# Patient Record
Sex: FEMALE
[De-identification: names, ages, dates, MRNs, and addresses within clinical notes are randomized per-mention and may not be internally consistent; named-entity substitution may affect disease eponyms.]

---
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
## 2024-06-27 ENCOUNTER — NURSE ONLY (OUTPATIENT)
Dept: INTERNAL MEDICINE CLINIC | Facility: HOSPITAL | Age: 24
End: 2024-06-27
Attending: PREVENTIVE MEDICINE

## 2024-06-27 DIAGNOSIS — Z00.00 WELLNESS EXAMINATION: Primary | ICD-10-CM

## 2024-06-27 PROCEDURE — 86480 TB TEST CELL IMMUN MEASURE: CPT

## 2024-06-28 LAB
M TB IFN-G CD4+ T-CELLS BLD-ACNC: 0.04 IU/ML
M TB TUBERC IFN-G BLD QL: NEGATIVE
M TB TUBERC IGNF/MITOGEN IGNF CONTROL: >10 IU/ML
QFT TB1 AG MINUS NIL: 0.03 IU/ML
QFT TB2 AG MINUS NIL: 0.02 IU/ML

## 2024-10-29 ENCOUNTER — OFFICE VISIT (OUTPATIENT)
Facility: CLINIC | Age: 24
End: 2024-10-29
Payer: COMMERCIAL

## 2024-10-29 VITALS
SYSTOLIC BLOOD PRESSURE: 116 MMHG | HEIGHT: 67 IN | BODY MASS INDEX: 31.55 KG/M2 | DIASTOLIC BLOOD PRESSURE: 72 MMHG | WEIGHT: 201 LBS | HEART RATE: 84 BPM

## 2024-10-29 DIAGNOSIS — Z11.3 ROUTINE SCREENING FOR STI (SEXUALLY TRANSMITTED INFECTION): ICD-10-CM

## 2024-10-29 DIAGNOSIS — Z01.419 ENCOUNTER FOR WELL WOMAN EXAM WITH ROUTINE GYNECOLOGICAL EXAM: ICD-10-CM

## 2024-10-29 DIAGNOSIS — Z30.41 USES ORAL CONTRACEPTION: ICD-10-CM

## 2024-10-29 PROCEDURE — 87591 N.GONORRHOEAE DNA AMP PROB: CPT

## 2024-10-29 PROCEDURE — 87491 CHLMYD TRACH DNA AMP PROBE: CPT

## 2024-10-29 PROCEDURE — 99385 PREV VISIT NEW AGE 18-39: CPT

## 2024-10-29 RX ORDER — NORGESTIMATE AND ETHINYL ESTRADIOL 0.25-0.035
1 KIT ORAL DAILY
Qty: 28 TABLET | Refills: 11 | Status: SHIPPED | OUTPATIENT
Start: 2024-10-29 | End: 2025-09-30

## 2024-10-29 RX ORDER — NORGESTIMATE AND ETHINYL ESTRADIOL 0.25-0.035
KIT ORAL
COMMUNITY
Start: 2021-06-28 | End: 2024-10-29

## 2024-10-29 NOTE — PROGRESS NOTES
Fouzia Hernandez is a 24 year old female  Patient's last menstrual period was 10/08/2024 (exact date).   Chief Complaint   Patient presents with    Wellness Visit    Contraception     Refill of birth control pills.   .  Per pt she had a Pap smear last year via [facility] - results normal   She has received HPV vaccine previously.     OBSTETRICS HISTORY:  OB History    Para Term  AB Living   0 0 0 0 0 0   SAB IAB Ectopic Multiple Live Births   0 0 0 0 0       GYNE HISTORY:  Periods regular monthly    History   Sexual Activity    Sexual activity: Yes    Partners: Male    Birth control/ protection: OCP        Hx Prior Abnormal Pap: No  Follow Up Recommendation: Last pap smear per pt, , normal.        MEDICAL HISTORY:  History reviewed. No pertinent past medical history.    SURGICAL HISTORY:  History reviewed. No pertinent surgical history.    SOCIAL HISTORY:  Social History     Socioeconomic History    Marital status: Single     Spouse name: Not on file    Number of children: Not on file    Years of education: Not on file    Highest education level: Not on file   Occupational History    Not on file   Tobacco Use    Smoking status: Never    Smokeless tobacco: Never   Substance and Sexual Activity    Alcohol use: Yes    Drug use: Never    Sexual activity: Yes     Partners: Male     Birth control/protection: OCP   Other Topics Concern    Not on file   Social History Narrative    Not on file     Social Drivers of Health     Financial Resource Strain: Not on File (10/6/2022)    Received from [facility]    Financial Resource Strain     Financial Resource Strain: 0   Food Insecurity: Not at Risk (2024)    Received from [facility]    Food Insecurity     Food: 1   Transportation Needs: Not at Risk (2024)    Received from [facility]    Transportation Needs     Transportation: 1   Physical Activity: Not on File (10/6/2022)    Received from [facility]    Physical Activity     Physical Activity: 0   Stress: Not on File  (10/6/2022)    Received from [facility]    Stress     Stress: 0   Social Connections: Not on File (2024)    Received from [employer] [facility]    [employer]     Connectedness: 0   Housing Stability: Not at Risk (2024)    Received from [employer]    Housing Stability     Housin       FAMILY HISTORY:  History reviewed. No pertinent family history.    MEDICATIONS:    Current Outpatient Medications:     Norgestimate-Eth Estradiol (MONO-LINYAH) 0.25-35 MG-MCG Oral Tab, Take 1 tablet by mouth daily., Disp: 28 tablet, Rfl: 11    ALLERGIES:  Allergies[1]      Review of Systems:  Constitutional:  Denies fatigue, night sweats, hot flashes  Eyes:  denies blurred or double vision  Cardiovascular:  denies chest pain or palpitations  Respiratory:  denies shortness of breath  Gastrointestinal:  denies heartburn, abdominal pain, diarrhea or constipation  Genitourinary:  denies dysuria, incontinence, abnormal vaginal discharge, vaginal itching  Musculoskeletal:  denies back pain.  Skin/Breast:  Denies any breast pain, lumps, or discharge.   Neurological:  denies headaches, extremity weakness or numbness.  Psychiatric: denies depression or anxiety.  Endocrine:   denies excessive thirst or urination.  Heme/Lymph:  denies history of anemia, easy bruising or bleeding.      PHYSICAL EXAM:   Constitutional: well developed, well nourished  Head/Face: normocephalic  Neck/Thyroid: thyroid symmetric, no thyromegaly, no nodules, no adenopathy  Lymphatic:no abnormal supraclavicular or axillary adenopathy is noted  Breast: normal without palpable masses, tenderness, asymmetry, nipple discharge, nipple retraction or skin changes  Abdomen:  soft, nontender, nondistended, no masses  Skin/Hair: no unusual rashes or bruises  Extremities: no edema, no cyanosis  Psychiatric:  Oriented to time, place, person and situation. Appropriate mood and affect    Pelvic Exam:  External Genitalia: normal appearance, hair distribution, and no lesions  Urethral Meatus:   normal in size, location, without lesions and prolapse  Bladder:  No fullness, masses or tenderness  Vagina:  Normal appearance without lesions, no abnormal discharge  Cervix:  Normal without tenderness on motion  Uterus: normal in size, contour, position, mobility, without tenderness  Adnexa: normal without masses or tenderness  Perineum: normal  Anus: no hemorroids     Assessment & Plan:  Diagnoses and all orders for this visit:    Encounter for well woman exam with routine gynecological exam    Uses oral contraception  -     Norgestimate-Eth Estradiol (MONO-LINYAH) 0.25-35 MG-MCG Oral Tab; Take 1 tablet by mouth daily.    Routine screening for STI (sexually transmitted infection)  -     Chlamydia/Gc Amplification; Future                   [1] Not on File

## 2024-10-30 LAB
C TRACH DNA SPEC QL NAA+PROBE: NEGATIVE
N GONORRHOEA DNA SPEC QL NAA+PROBE: NEGATIVE

## 2025-01-27 DIAGNOSIS — Z30.41 USES ORAL CONTRACEPTION: ICD-10-CM

## 2025-01-27 RX ORDER — NORGESTIMATE AND ETHINYL ESTRADIOL 0.25-0.035
1 KIT ORAL DAILY
Qty: 84 TABLET | Refills: 1 | Status: SHIPPED | OUTPATIENT
Start: 2025-01-27

## 2025-02-06 ENCOUNTER — OFFICE VISIT (OUTPATIENT)
Dept: OTHER | Facility: HOSPITAL | Age: 25
End: 2025-02-06
Attending: EMERGENCY MEDICINE
Payer: COMMERCIAL

## 2025-02-06 ENCOUNTER — HOSPITAL ENCOUNTER (OUTPATIENT)
Dept: CT IMAGING | Facility: HOSPITAL | Age: 25
Discharge: HOME | End: 2025-02-06
Attending: EMERGENCY MEDICINE
Payer: COMMERCIAL

## 2025-02-06 DIAGNOSIS — R52 PAIN: Primary | ICD-10-CM

## 2025-02-06 DIAGNOSIS — R52 PAIN: ICD-10-CM

## 2025-02-06 PROCEDURE — 70450 CT HEAD/BRAIN W/O DYE: CPT | Performed by: EMERGENCY MEDICINE

## 2025-02-13 ENCOUNTER — APPOINTMENT (OUTPATIENT)
Dept: OTHER | Facility: HOSPITAL | Age: 25
End: 2025-02-13
Attending: EMERGENCY MEDICINE

## 2025-04-25 ENCOUNTER — OFFICE VISIT (OUTPATIENT)
Dept: INTERNAL MEDICINE CLINIC | Facility: CLINIC | Age: 25
End: 2025-04-25
Payer: COMMERCIAL

## 2025-04-25 VITALS
WEIGHT: 192.19 LBS | BODY MASS INDEX: 30.89 KG/M2 | HEART RATE: 96 BPM | HEIGHT: 66.14 IN | DIASTOLIC BLOOD PRESSURE: 72 MMHG | RESPIRATION RATE: 16 BRPM | OXYGEN SATURATION: 100 % | SYSTOLIC BLOOD PRESSURE: 132 MMHG | TEMPERATURE: 99 F

## 2025-04-25 DIAGNOSIS — Z30.41 USES ORAL CONTRACEPTION: ICD-10-CM

## 2025-04-25 DIAGNOSIS — F41.9 ANXIOUSNESS: ICD-10-CM

## 2025-04-25 DIAGNOSIS — L98.9 LESION OF FINGER: ICD-10-CM

## 2025-04-25 DIAGNOSIS — Z00.00 WELLNESS EXAMINATION: Primary | ICD-10-CM

## 2025-04-25 DIAGNOSIS — L73.9 FOLLICULITIS: ICD-10-CM

## 2025-04-25 DIAGNOSIS — E04.9 ENLARGED THYROID: ICD-10-CM

## 2025-04-25 RX ORDER — MUPIROCIN CALCIUM 20 MG/G
1 CREAM TOPICAL DAILY
Qty: 15 G | Refills: 1 | Status: SHIPPED | OUTPATIENT
Start: 2025-04-25 | End: 2025-05-02

## 2025-04-25 RX ORDER — NORGESTIMATE AND ETHINYL ESTRADIOL 0.25-0.035
1 KIT ORAL DAILY
Qty: 84 TABLET | Refills: 1 | Status: SHIPPED | OUTPATIENT
Start: 2025-04-25

## 2025-04-25 NOTE — PROGRESS NOTES
Fouzia Hernandez  5/30/2000    Chief Complaint   Patient presents with    [facility]     ES rm -14 -  Establish care     SUBJECTIVE   Fouzia Hernandez is a 24 year old female who presents to [facility].    Past Medical History: none  Past Surgical History: none  Mediations:  Allergies  Family History: denies history of heart disease, cancer, diabetes in immediate family members  Social:   Tobacco: never   Occupation: float at [employer]    Review of Systems   Review of Systems   No f/c/chest pain or sob. No cough. No abd pain/n/v/d. No ha or dizziness. No numbness, tingling, or weakness.     OBJECTIVE:   /72 (BP Location: Left arm, Patient Position: Sitting, Cuff Size: adult)   Pulse 96   Temp 98.6 °F (37 °C) (Temporal)   Resp 16   Ht 5' 6.14\" (1.68 m)   Wt 192 lb 3.2 oz (87.2 kg)   LMP 10/08/2024 (Exact Date)   SpO2 100%   BMI 30.89 kg/m²   Physical Exam   Constitutional: Oriented to person, place, and time. No distress.   HEENT:  Normocephalic and atraumatic.Tympanic membranes appear normal. Oropharynx is clear and moist.   Eyes: Conjunctivae not injected.  Extraocular movements are intact  Neck: Full ROM.  Neck supple.  Symmetrically enlarged thyroid with no discrete nodules palpated.  Cardiovascular: Normal rate, regular rhythm and intact distal pulses.  No murmur, rubs or gallops.   Pulmonary/Chest: Effort normal and breath sounds normal. No respiratory distress.  Abdominal: Soft. Bowel sounds are normal. Non tender, no masses, no organomegaly or hernias.  Musculoskeletal: No edema in bilateral lower extremities.  Strength is 5/5 in bilateral upper and lower extremities.  Lymphadenopathy: No cervical adenopathy.   Neurological: No focal neurologic deficits.  Cranial nerves II through XII are intact.   Skin: 1 enlarged hair follicle in pubic region.  No lesions in the visualized left inguinal area.  On the lateral aspect of the left index finger is a slightly erythematous solitary papule.  No  visible foreign bodies no underlying fluctuance.  Psychiatric: Normal mood and affect.     ASSESSMENT AND PLAN:     1. Wellness examination  She is going to send vaccination record via [facility].  - CBC With Differential With Platelet; Future  - Comp Metabolic Panel; Future  - Lipid Panel; Future  - TSH W Reflex To Free T4; Future    2. Uses oral contraception  - Norgestimate-Eth Estradiol (MONO-LINYAH) 0.25-35 MG-MCG Oral Tab; Take 1 tablet by mouth daily.  Dispense: 84 tablet; Refill: 1    3. Enlarged thyroid  - US THYROID (CPT=76536); Future    4. Anxiousness  - [facility] Referral - In Network    5. Lesion of finger  Will try a few days of topical antibiotic.  No visible foreign bodies.  Not consistent with other infectious etiology such as hepatic vivienne.  - mupirocin 2 % External Cream; Apply 1 Application topically daily for 7 days.  Dispense: 15 g; Refill: 1    6. Folliculitis   no other lesions seen around the meatus or in the groin.Per patient not actively flaring at this time.  She was instructed to come back if flaring.  Consider HS?  - mupirocin 2 % External Cream; Apply 1 Application topically daily for 7 days.  Dispense: 15 g; Refill: 1     TODAY'S ORDERS     Orders Placed This Encounter   Procedures    CBC With Differential With Platelet    Comp Metabolic Panel    Lipid Panel    TSH W Reflex To Free T4       Meds & Refills:  Requested Prescriptions     Signed Prescriptions Disp Refills    Norgestimate-Eth Estradiol (MONO-LINYAH) 0.25-35 MG-MCG Oral Tab 84 tablet 1     Sig: Take 1 tablet by mouth daily.    mupirocin 2 % External Cream 15 g 1     Sig: Apply 1 Application topically daily for 7 days.       Imaging & Consults:  OP REFERRAL TO [facility]  US THYROID (CPT=76536)    No follow-ups on file.  There are no Patient Instructions on file for this visit.    All questions were answered and the patient agrees with the plan.     Thank you,  Jorge Weaver, DO

## 2025-06-07 ENCOUNTER — HOSPITAL ENCOUNTER (OUTPATIENT)
Dept: ULTRASOUND IMAGING | Age: 25
Discharge: HOME | End: 2025-06-07
Attending: INTERNAL MEDICINE
Payer: COMMERCIAL

## 2025-06-07 ENCOUNTER — PATIENT MESSAGE (OUTPATIENT)
Dept: INTERNAL MEDICINE CLINIC | Facility: CLINIC | Age: 25
End: 2025-06-07

## 2025-06-07 DIAGNOSIS — E04.9 ENLARGED THYROID: ICD-10-CM

## 2025-06-07 PROCEDURE — 76536 US EXAM OF HEAD AND NECK: CPT | Performed by: INTERNAL MEDICINE

## 2025-06-13 ENCOUNTER — OFFICE VISIT (OUTPATIENT)
Facility: LOCATION | Age: 25
End: 2025-06-13
Payer: COMMERCIAL

## 2025-06-13 DIAGNOSIS — E04.9 THYROID GOITER: ICD-10-CM

## 2025-06-13 DIAGNOSIS — E04.1 RIGHT THYROID NODULE: Primary | ICD-10-CM

## 2025-06-13 PROCEDURE — 99204 OFFICE O/P NEW MOD 45 MIN: CPT | Performed by: OTOLARYNGOLOGY

## 2025-06-13 NOTE — PROGRESS NOTES
Otolaryngology Consultation Note     Reason for consultation: thyroid nodule  Consulting physician and service: Jorge Weaver DO      HPI: 26 y/o F presents with enlarged thyroid gland and thyroid nodule. No personal or family h/o thyroid disorders or thyroid cancer. No signs/symptoms hyper/hypothyroidism. No changes in voice/hoarseness. No dysphagia, odynophagia or difficulty breathing. No unintentional weight loss or loss of appetite. No other complaints.      Past Medical History: Past Medical History[1]     Past Surgical History: Past Surgical History[2]     Medication: Scheduled Meds:Scheduled Medications[3]  Continuous Infusions:Medication Infusions[4]  PRN Meds:.PRN Medications[5]     Allergies:  Allergies[6]  Pertinent Family History: Family History[7]     Pertinent Social History:   Social History     Socioeconomic History    Marital status: Single     Spouse name: Not on file    Number of children: Not on file    Years of education: Not on file    Highest education level: Not on file   Occupational History    Not on file   Tobacco Use    Smoking status: Never     Passive exposure: Never    Smokeless tobacco: Never   Vaping Use    Vaping status: Never Used   Substance and Sexual Activity    Alcohol use: Yes     Alcohol/week: 2.0 standard drinks of alcohol     Types: 1 Glasses of wine, 1 Shots of liquor per week     Comment: socially - 4/25/25    Drug use: Never    Sexual activity: Yes     Partners: Male     Birth control/protection: OCP   Other Topics Concern    Caffeine Concern No    Exercise No    Seat Belt No    Special Diet No    Stress Concern No    Weight Concern Yes   Social History Narrative    Not on file     Social Drivers of Health     Food Insecurity: Not at Risk (5/25/2024)    Received from [employer]    Food Insecurity     Food: 1   Transportation Needs: Not at Risk (5/25/2024)    Received from [facility]    Transportation Needs     Transportation: 1   Stress: Not on File (10/6/2022)    Received  from [facility]    Stress     Stress: 0   Housing Stability: Not at Risk (2024)    Received from [facility]    Housing Stability     Housin        Review of Systems:  Constitutional: Negative.  HENT: see above  Eyes: Negative.  Respiratory: Negative.  Cardiovascular: Negative.  Gastrointestinal: Negative.  Musculoskeletal: Negative.  Skin: Negative.  Renal: Negative  Endocrine: Negative  Psychiatric/Behavioral: Negative.     Physical Examination:  Vitals: Last menstrual period 10/08/2024, not currently breastfeeding.     General: Breathing comfortably on room air while sitting up. Able to communicate verbally. Voice normal. Normal appearing body habitus.     Musculoskeletal: Head: Atraumatic and normocephalic.     Neck: Full ROM and able to extend without issues     Ears: External auditory canals clear with no evidence of significant cerumen or stenosis. Tympanic membranes visible with no evidence of retraction or perforation. No evidence of middle ear effusion bilaterally.     Nose: No sinus tenderness bilaterally upon palpation. No obvious nasal deformity. No masses, rhinorrhea, epistaxis. Nasal septum, mucosa, and turbinates appear normal.     Mouth/Throat: Salivary glands appear normal with no evidence of pain or mass. No masses or lesions noted within the oral mucosa, hard and soft palates, tongue, tonsils and posterior pharynx. Able to tolerate secretions. Oral cavity and oropharynx widely patent. Tonsils 1 plus and symmetric. Posterior pharyngeal walls appear normal. Thyroid non tender to palpation without evidence of mass or nodules, right thyroid gland enlarged.      Eyes: Extraocular movements intact and pupils equally reactive to light stimulus. No spontaneous or gaze-evoked nystagmus. No proptosis or ecchymosis. VFI.     Lymphatic: No significant cervical lymphadenopathy noted.     Neuro: CN 7 intact with symmetric mobility and strength. No loss of facial sensation.     Skin: Dry, normal turgor, normal  color.     Psych: Alert and oriented to person/place/time. Normal affect, amiable     Significant laboratory values: n/a     Imaging:   US Thyroid results reviewed    FINDINGS:       MEASUREMENTS:  RIGHT LOBE:  6.9 x 1.6 x 2.6 cm  LEFT LOBE:  5.8 x 1.4 x 2.3 cm  ISTHMUS:  0.3 cm     NODULES:  There is a single mildly hypoechoic solid nodule within the midpole the right lobe measuring 18 x 9 x 13 mm.  This is a TI-RADS 4. No other thyroid nodules are seen.       OTHER:  The thyroid gland does not appear hypervascular.  No adenopathy is identified in the soft tissues adjacent to the thyroid gland.                   Impression   CONCLUSION:    1. Thyromegaly.  2. Dominant TI-RADS 4 lesion in the midpole the right lobe.  Fine needle aspiration biopsy recommended.          Procedures: n/a     Assessment/Plan:     Right thyroid nodule: recommend FNA. Advised patient to undergo TFTs prior to biopsy.  Thyroid goiter: asymptomatic. Will monitor      Dr. Jorge Weaver DO, thank you for involving me in this patient's care. Please contact me with further questions or concerns.    Safia Wilson MD         [1] No past medical history on file.  [2] No past surgical history on file.  [3] [4] [5] [6] Not on File  [7] No family history on file.

## 2025-06-28 ENCOUNTER — LAB ENCOUNTER (OUTPATIENT)
Dept: LAB | Age: 25
End: 2025-06-28
Attending: INTERNAL MEDICINE
Payer: COMMERCIAL

## 2025-06-28 DIAGNOSIS — Z00.00 WELLNESS EXAMINATION: ICD-10-CM

## 2025-06-28 PROBLEM — E78.00 PURE HYPERCHOLESTEROLEMIA: Status: ACTIVE | Noted: 2025-06-28

## 2025-06-28 LAB
ALBUMIN SERPL-MCNC: 4.5 G/DL (ref 3.2–4.8)
ALBUMIN/GLOB SERPL: 1.4 {RATIO} (ref 1–2)
ALP LIVER SERPL-CCNC: 69 U/L (ref 37–98)
ALT SERPL-CCNC: 11 U/L (ref 10–49)
ANION GAP SERPL CALC-SCNC: 10 MMOL/L (ref 0–18)
AST SERPL-CCNC: 20 U/L (ref ?–34)
BASOPHILS # BLD AUTO: 0.03 X10(3) UL (ref 0–0.2)
BASOPHILS NFR BLD AUTO: 0.4 %
BILIRUB SERPL-MCNC: 0.5 MG/DL (ref 0.3–1.2)
BUN BLD-MCNC: 17 MG/DL (ref 9–23)
CALCIUM BLD-MCNC: 9.6 MG/DL (ref 8.7–10.6)
CHLORIDE SERPL-SCNC: 105 MMOL/L (ref 98–112)
CHOLEST SERPL-MCNC: 207 MG/DL (ref ?–200)
CO2 SERPL-SCNC: 24 MMOL/L (ref 21–32)
CREAT BLD-MCNC: 0.89 MG/DL (ref 0.55–1.02)
EGFRCR SERPLBLD CKD-EPI 2021: 93 ML/MIN/1.73M2 (ref 60–?)
EOSINOPHIL # BLD AUTO: 0.07 X10(3) UL (ref 0–0.7)
EOSINOPHIL NFR BLD AUTO: 0.8 %
ERYTHROCYTE [DISTWIDTH] IN BLOOD BY AUTOMATED COUNT: 13.4 %
FASTING PATIENT LIPID ANSWER: YES
FASTING STATUS PATIENT QL REPORTED: YES
GLOBULIN PLAS-MCNC: 3.2 G/DL (ref 2–3.5)
GLUCOSE BLD-MCNC: 91 MG/DL (ref 70–99)
HCT VFR BLD AUTO: 37.5 % (ref 35–48)
HDLC SERPL-MCNC: 61 MG/DL (ref 40–59)
HGB BLD-MCNC: 12.2 G/DL (ref 12–16)
IMM GRANULOCYTES # BLD AUTO: 0.02 X10(3) UL (ref 0–1)
IMM GRANULOCYTES NFR BLD: 0.2 %
LDLC SERPL CALC-MCNC: 124 MG/DL (ref ?–100)
LYMPHOCYTES # BLD AUTO: 2.27 X10(3) UL (ref 1–4)
LYMPHOCYTES NFR BLD AUTO: 26.5 %
MCH RBC QN AUTO: 28.9 PG (ref 26–34)
MCHC RBC AUTO-ENTMCNC: 32.5 G/DL (ref 31–37)
MCV RBC AUTO: 88.9 FL (ref 80–100)
MONOCYTES # BLD AUTO: 0.47 X10(3) UL (ref 0.1–1)
MONOCYTES NFR BLD AUTO: 5.5 %
NEUTROPHILS # BLD AUTO: 5.71 X10 (3) UL (ref 1.5–7.7)
NEUTROPHILS # BLD AUTO: 5.71 X10(3) UL (ref 1.5–7.7)
NEUTROPHILS NFR BLD AUTO: 66.6 %
NONHDLC SERPL-MCNC: 146 MG/DL (ref ?–130)
OSMOLALITY SERPL CALC.SUM OF ELEC: 289 MOSM/KG (ref 275–295)
PLATELET # BLD AUTO: 278 10(3)UL (ref 150–450)
POTASSIUM SERPL-SCNC: 4.1 MMOL/L (ref 3.5–5.1)
PROT SERPL-MCNC: 7.7 G/DL (ref 5.7–8.2)
RBC # BLD AUTO: 4.22 X10(6)UL (ref 3.8–5.3)
SODIUM SERPL-SCNC: 139 MMOL/L (ref 136–145)
TRIGL SERPL-MCNC: 127 MG/DL (ref 30–149)
TSI SER-ACNC: 1.09 UIU/ML (ref 0.55–4.78)
VLDLC SERPL CALC-MCNC: 22 MG/DL (ref 0–30)
WBC # BLD AUTO: 8.6 X10(3) UL (ref 4–11)

## 2025-06-28 PROCEDURE — 84443 ASSAY THYROID STIM HORMONE: CPT

## 2025-06-28 PROCEDURE — 36415 COLL VENOUS BLD VENIPUNCTURE: CPT

## 2025-06-28 PROCEDURE — 80061 LIPID PANEL: CPT

## 2025-06-28 PROCEDURE — 85025 COMPLETE CBC W/AUTO DIFF WBC: CPT

## 2025-06-28 PROCEDURE — 80053 COMPREHEN METABOLIC PANEL: CPT

## 2025-07-02 ENCOUNTER — HOSPITAL ENCOUNTER (OUTPATIENT)
Dept: ULTRASOUND IMAGING | Facility: HOSPITAL | Age: 25
Discharge: HOME | End: 2025-07-02
Attending: OTOLARYNGOLOGY
Payer: COMMERCIAL

## 2025-07-02 DIAGNOSIS — E04.1 RIGHT THYROID NODULE: ICD-10-CM

## 2025-07-02 PROCEDURE — 88173 CYTOPATH EVAL FNA REPORT: CPT | Performed by: OTOLARYNGOLOGY

## 2025-07-02 PROCEDURE — 10005 FNA BX W/US GDN 1ST LES: CPT | Performed by: OTOLARYNGOLOGY

## 2025-07-06 ENCOUNTER — PATIENT MESSAGE (OUTPATIENT)
Dept: INTERNAL MEDICINE CLINIC | Facility: CLINIC | Age: 25
End: 2025-07-06

## 2025-07-09 NOTE — TELEPHONE ENCOUNTER
Due to patients work schedule, patient is scheduled on below.  FYI to MP  Future Appointments   Date Time Provider Department Center   7/11/2025  2:00 PM Diana Gonzales APRN [id number]

## 2025-07-09 NOTE — TELEPHONE ENCOUNTER
Patient transferred to triage. She scheduled an appointment on 7/11 for possible allergic reaction to iodine after biopsy. Offered sooner appointment but patient unable to make it due to work schedule. Recommended patient go to IC with any worsening symptoms. Will keep current appointment as scheduled.  She confirms understanding.

## 2025-07-10 NOTE — TELEPHONE ENCOUNTER
BD not available for current scheduled appointment.     Offered to reschedule for the same day at 1:30pm appointment slot, patient refusing states she has work that day and she is unable to leave work for this appointment.     Advised patient to go to urgent care for evaluation. Verbalizes understanding

## (undated) NOTE — LETTER
Date: 4/25/2025    Patient Name: Fouzia Hernandez          To Whom it may concern:    The above patient was seen at [facility] for treatment of a medical condition.    This patient's tardiness should be excused this morning since she was undergoing a thorough medication examination at my office.         Sincerely,    Jorge Weaver DO